# Patient Record
Sex: MALE | Race: WHITE
[De-identification: names, ages, dates, MRNs, and addresses within clinical notes are randomized per-mention and may not be internally consistent; named-entity substitution may affect disease eponyms.]

---

## 2021-09-02 ENCOUNTER — HOSPITAL ENCOUNTER (EMERGENCY)
Dept: HOSPITAL 95 - ER | Age: 81
LOS: 1 days | Discharge: HOME | End: 2021-09-03
Payer: COMMERCIAL

## 2021-09-02 VITALS — HEIGHT: 78 IN | BODY MASS INDEX: 36.45 KG/M2 | WEIGHT: 315 LBS

## 2021-09-02 DIAGNOSIS — Z79.899: ICD-10-CM

## 2021-09-02 DIAGNOSIS — S52.124A: Primary | ICD-10-CM

## 2021-09-02 DIAGNOSIS — I10: ICD-10-CM
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
## 2021-09-02 DIAGNOSIS — Z79.82: ICD-10-CM

## 2021-09-02 DIAGNOSIS — S01.111A: ICD-10-CM

## 2021-09-02 DIAGNOSIS — Z88.5: ICD-10-CM

## 2021-09-02 DIAGNOSIS — I25.10: ICD-10-CM

## 2021-09-02 DIAGNOSIS — Z88.8: ICD-10-CM

## 2021-09-02 DIAGNOSIS — E11.9: ICD-10-CM

## 2021-09-02 DIAGNOSIS — Z79.4: ICD-10-CM

## 2021-09-02 DIAGNOSIS — S02.2XXA: ICD-10-CM
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
## 2021-11-01 ENCOUNTER — HOSPITAL ENCOUNTER (OUTPATIENT)
Dept: HOSPITAL 95 - LAB SHORT | Age: 81
Discharge: HOME | End: 2021-11-01
Attending: SPECIALIST
Payer: COMMERCIAL

## 2021-11-01 DIAGNOSIS — D52.8: ICD-10-CM

## 2021-11-01 DIAGNOSIS — N18.30: Primary | ICD-10-CM

## 2021-11-01 DIAGNOSIS — R94.6: ICD-10-CM

## 2021-11-01 DIAGNOSIS — E55.9: ICD-10-CM

## 2021-11-01 DIAGNOSIS — D51.8: ICD-10-CM

## 2021-11-01 DIAGNOSIS — E78.00: ICD-10-CM

## 2021-11-01 DIAGNOSIS — R76.9: ICD-10-CM

## 2021-11-01 DIAGNOSIS — R94.5: ICD-10-CM

## 2021-11-01 DIAGNOSIS — D50.9: ICD-10-CM

## 2021-11-01 DIAGNOSIS — D63.1: ICD-10-CM

## 2021-11-01 DIAGNOSIS — N25.81: ICD-10-CM

## 2021-11-01 LAB — PROT UR-MCNC: 18 MG/DL (ref 0–11.9)

## 2021-11-05 LAB — ALUMINUM/CREAT UR: 33 (ref 0–49)

## 2022-03-17 ENCOUNTER — HOSPITAL ENCOUNTER (OUTPATIENT)
Dept: HOSPITAL 95 - ER | Age: 82
Setting detail: OBSERVATION
LOS: 2 days | Discharge: HOME | End: 2022-03-19
Attending: HOSPITALIST | Admitting: HOSPITALIST
Payer: COMMERCIAL

## 2022-03-17 VITALS — BODY MASS INDEX: 36.45 KG/M2 | HEIGHT: 78 IN | WEIGHT: 315 LBS

## 2022-03-17 DIAGNOSIS — Z95.5: ICD-10-CM

## 2022-03-17 DIAGNOSIS — I44.30: ICD-10-CM

## 2022-03-17 DIAGNOSIS — I12.9: ICD-10-CM

## 2022-03-17 DIAGNOSIS — N18.30: ICD-10-CM

## 2022-03-17 DIAGNOSIS — E78.5: ICD-10-CM

## 2022-03-17 DIAGNOSIS — Z79.82: ICD-10-CM

## 2022-03-17 DIAGNOSIS — Z95.0: ICD-10-CM

## 2022-03-17 DIAGNOSIS — Z79.4: ICD-10-CM

## 2022-03-17 DIAGNOSIS — I24.9: Primary | ICD-10-CM

## 2022-03-17 DIAGNOSIS — Z91.14: ICD-10-CM

## 2022-03-17 DIAGNOSIS — I25.10: ICD-10-CM

## 2022-03-17 DIAGNOSIS — E11.22: ICD-10-CM

## 2022-03-17 LAB
ALBUMIN SERPL BCP-MCNC: 3.3 G/DL (ref 3.4–5)
ALBUMIN/GLOB SERPL: 0.7 {RATIO} (ref 0.8–1.8)
ALT SERPL W P-5'-P-CCNC: 28 U/L (ref 12–78)
ANION GAP SERPL CALCULATED.4IONS-SCNC: 6 MMOL/L (ref 6–16)
AST SERPL W P-5'-P-CCNC: 19 U/L (ref 12–37)
BASOPHILS # BLD AUTO: 0.05 K/MM3 (ref 0–0.23)
BASOPHILS NFR BLD AUTO: 1 % (ref 0–2)
BILIRUB SERPL-MCNC: 0.7 MG/DL (ref 0.1–1)
BUN SERPL-MCNC: 34 MG/DL (ref 8–24)
CALCIUM SERPL-MCNC: 8.6 MG/DL (ref 8.5–10.1)
CHLORIDE SERPL-SCNC: 109 MMOL/L (ref 98–108)
CO2 SERPL-SCNC: 23 MMOL/L (ref 21–32)
CREAT SERPL-MCNC: 1.5 MG/DL (ref 0.6–1.2)
DEPRECATED RDW RBC AUTO: 46.1 FL (ref 35.1–46.3)
EOSINOPHIL # BLD AUTO: 0.34 K/MM3 (ref 0–0.68)
EOSINOPHIL NFR BLD AUTO: 4 % (ref 0–6)
ERYTHROCYTE [DISTWIDTH] IN BLOOD BY AUTOMATED COUNT: 14.6 % (ref 11.7–14.2)
GLOBULIN SER CALC-MCNC: 4.6 G/DL (ref 2.2–4)
GLUCOSE SERPL-MCNC: 307 MG/DL (ref 70–99)
HCT VFR BLD AUTO: 39.1 % (ref 37–53)
HGB BLD-MCNC: 12.4 G/DL (ref 13.5–17.5)
IMM GRANULOCYTES # BLD AUTO: 0.03 K/MM3 (ref 0–0.1)
IMM GRANULOCYTES NFR BLD AUTO: 0 % (ref 0–1)
LYMPHOCYTES # BLD AUTO: 1.8 K/MM3 (ref 0.84–5.2)
LYMPHOCYTES NFR BLD AUTO: 21 % (ref 21–46)
MCHC RBC AUTO-ENTMCNC: 31.7 G/DL (ref 31.5–36.5)
MCV RBC AUTO: 88 FL (ref 80–100)
MONOCYTES # BLD AUTO: 0.49 K/MM3 (ref 0.16–1.47)
MONOCYTES NFR BLD AUTO: 6 % (ref 4–13)
NEUTROPHILS # BLD AUTO: 5.84 K/MM3 (ref 1.96–9.15)
NEUTROPHILS NFR BLD AUTO: 68 % (ref 41–73)
NRBC # BLD AUTO: 0 K/MM3 (ref 0–0.02)
NRBC BLD AUTO-RTO: 0 /100 WBC (ref 0–0.2)
PLATELET # BLD AUTO: 174 K/MM3 (ref 150–400)
POTASSIUM SERPL-SCNC: 5.9 MMOL/L (ref 3.5–5.5)
PROT SERPL-MCNC: 7.9 G/DL (ref 6.4–8.2)
SODIUM SERPL-SCNC: 138 MMOL/L (ref 136–145)

## 2022-03-17 PROCEDURE — A9500 TC99M SESTAMIBI: HCPCS

## 2022-03-17 PROCEDURE — A9270 NON-COVERED ITEM OR SERVICE: HCPCS

## 2022-03-18 LAB
ALBUMIN SERPL BCP-MCNC: 2.9 G/DL (ref 3.4–5)
ALBUMIN/GLOB SERPL: 0.7 {RATIO} (ref 0.8–1.8)
ALT SERPL W P-5'-P-CCNC: 19 U/L (ref 12–78)
ANION GAP SERPL CALCULATED.4IONS-SCNC: 5 MMOL/L (ref 6–16)
AST SERPL W P-5'-P-CCNC: 17 U/L (ref 12–37)
BASOPHILS # BLD AUTO: 0.03 K/MM3 (ref 0–0.23)
BASOPHILS NFR BLD AUTO: 0 % (ref 0–2)
BILIRUB SERPL-MCNC: 0.4 MG/DL (ref 0.1–1)
BUN SERPL-MCNC: 31 MG/DL (ref 8–24)
CALCIUM SERPL-MCNC: 8.7 MG/DL (ref 8.5–10.1)
CHLORIDE SERPL-SCNC: 112 MMOL/L (ref 98–108)
CK SERPL-CCNC: 151 U/L (ref 39–308)
CK SERPL-CCNC: 153 U/L (ref 39–308)
CO2 SERPL-SCNC: 24 MMOL/L (ref 21–32)
CREAT SERPL-MCNC: 1.4 MG/DL (ref 0.6–1.2)
DEPRECATED RDW RBC AUTO: 47 FL (ref 35.1–46.3)
EOSINOPHIL # BLD AUTO: 0.25 K/MM3 (ref 0–0.68)
EOSINOPHIL NFR BLD AUTO: 4 % (ref 0–6)
ERYTHROCYTE [DISTWIDTH] IN BLOOD BY AUTOMATED COUNT: 14.6 % (ref 11.7–14.2)
GLOBULIN SER CALC-MCNC: 4.4 G/DL (ref 2.2–4)
GLUCOSE SERPL-MCNC: 211 MG/DL (ref 70–99)
HCT VFR BLD AUTO: 36.8 % (ref 37–53)
HGB BLD-MCNC: 11.7 G/DL (ref 13.5–17.5)
IMM GRANULOCYTES # BLD AUTO: 0.02 K/MM3 (ref 0–0.1)
IMM GRANULOCYTES NFR BLD AUTO: 0 % (ref 0–1)
LYMPHOCYTES # BLD AUTO: 1.46 K/MM3 (ref 0.84–5.2)
LYMPHOCYTES NFR BLD AUTO: 21 % (ref 21–46)
MCHC RBC AUTO-ENTMCNC: 31.8 G/DL (ref 31.5–36.5)
MCV RBC AUTO: 88 FL (ref 80–100)
MONOCYTES # BLD AUTO: 0.42 K/MM3 (ref 0.16–1.47)
MONOCYTES NFR BLD AUTO: 6 % (ref 4–13)
NEUTROPHILS # BLD AUTO: 4.68 K/MM3 (ref 1.96–9.15)
NEUTROPHILS NFR BLD AUTO: 68 % (ref 41–73)
NRBC # BLD AUTO: 0 K/MM3 (ref 0–0.02)
NRBC BLD AUTO-RTO: 0 /100 WBC (ref 0–0.2)
PLATELET # BLD AUTO: 131 K/MM3 (ref 150–400)
POTASSIUM SERPL-SCNC: 5.4 MMOL/L (ref 3.5–5.5)
PROT SERPL-MCNC: 7.3 G/DL (ref 6.4–8.2)
SODIUM SERPL-SCNC: 141 MMOL/L (ref 136–145)

## 2022-03-18 NOTE — NUR
ALERT. ORIENTED. INDEPENDENT IN ROOM. ABLE TO MAKE NEEDS KNOWN. DENIES ANY
C.P/DISCOMFORT. UNLABORED RESPIRATIONS. TELE ON AND HAS BEEN PACED RYTHM.
TROPONINS NEGATIVE. COOPERATIVE. PLEASANT. SECOND PART OF STRESS TOMORROW
MORNING WITH PATIENT AWARE NO CAFFEINE AFTER DINNER. WCTM

## 2022-03-18 NOTE — NUR
Patient is alert and oriented x4 ambulates with walker. hx of fall, educated
pt about calling when ambulating to the restroom to assist him with walking.
Patient understands, but insist on walking by himself and sometimes does not
use walker. Skin intact. Patient complains of abdominal and chest pain, but
does not want to take pain medication. BP is elevated, informed MD.
Hydralazine IV ordered for SBP greater than 160s. Patient is now resting on
bed in lowest position. Patient is incontinent in urine due to Hx of prostate
cancer. Call light within reach.

## 2022-03-19 LAB
CHOLEST SERPL-MCNC: 172 MG/DL (ref 50–200)
CHOLEST/HDLC SERPL: 4.8 {RATIO}
HDLC SERPL-MCNC: 36 MG/DL (ref 39–?)
LDLC SERPL CALC-MCNC: 101 MG/DL (ref 0–110)
LDLC/HDLC SERPL: 2.8 {RATIO}
TRIGL SERPL-MCNC: 174 MG/DL (ref 30–160)
VLDLC SERPL CALC-MCNC: 34 MG/DL (ref 6–32)

## 2022-03-19 NOTE — NUR
pt having a stress test this am, a/ox3, pleasant and cooperative with care,
follows commands well, denies chest pain, states he slept ok, lungs are clear
t/o, on r/a, resp even and unlabored, no cough noted, hrr, tele in place
running paced rhythm, no edema noted, ppp+2, cap refill <3sec, vs stable,
afebrile, iv site is clear and patent, btx4, abd round soft nontender, voids
without diff, skin c/w/d, maew, up indep, teri, call light in reach.

## 2022-03-19 NOTE — NUR
Pt has been discharged to home, [facility],
iv removed intact, went over instructions with him, he verbalized
understanding, gave a copy of a medium sliding scale to him and explained it.
he verbalized understanding, and has all his belongings, and hard copy script
for glucose monitoring supplies. left via wheelchair with cna in attendence.

## 2022-07-06 ENCOUNTER — HOSPITAL ENCOUNTER (OUTPATIENT)
Dept: HOSPITAL 95 - MHTC | Age: 82
Discharge: HOME | End: 2022-07-06
Attending: NURSE PRACTITIONER
Payer: COMMERCIAL

## 2022-07-06 VITALS — HEIGHT: 78 IN | BODY MASS INDEX: 36.45 KG/M2 | WEIGHT: 315 LBS

## 2022-07-06 DIAGNOSIS — E78.00: ICD-10-CM

## 2022-07-06 DIAGNOSIS — Z95.0: ICD-10-CM

## 2022-07-06 DIAGNOSIS — I77.810: ICD-10-CM

## 2022-07-06 DIAGNOSIS — Z88.5: ICD-10-CM

## 2022-07-06 DIAGNOSIS — I44.30: ICD-10-CM

## 2022-07-06 DIAGNOSIS — I25.110: Primary | ICD-10-CM

## 2022-07-06 DIAGNOSIS — E11.22: ICD-10-CM

## 2022-07-06 DIAGNOSIS — Z86.39: ICD-10-CM

## 2022-07-06 DIAGNOSIS — G47.33: ICD-10-CM

## 2022-07-06 DIAGNOSIS — Z79.4: ICD-10-CM

## 2022-07-06 DIAGNOSIS — Z90.49: ICD-10-CM

## 2022-07-06 DIAGNOSIS — D69.6: ICD-10-CM

## 2022-07-06 DIAGNOSIS — Z45.2: ICD-10-CM

## 2022-07-06 DIAGNOSIS — N18.30: ICD-10-CM

## 2022-07-06 DIAGNOSIS — I12.9: ICD-10-CM

## 2022-07-06 LAB
ANION GAP SERPL CALCULATED.4IONS-SCNC: 8 MMOL/L (ref 6–16)
BASOPHILS # BLD AUTO: 0.04 K/MM3 (ref 0–0.23)
BASOPHILS NFR BLD AUTO: 0 % (ref 0–2)
BUN SERPL-MCNC: 44 MG/DL (ref 8–24)
CALCIUM SERPL-MCNC: 8.8 MG/DL (ref 8.5–10.1)
CHLORIDE SERPL-SCNC: 112 MMOL/L (ref 98–108)
CO2 SERPL-SCNC: 23 MMOL/L (ref 21–32)
CREAT SERPL-MCNC: 1.73 MG/DL (ref 0.6–1.2)
DEPRECATED RDW RBC AUTO: 44.3 FL (ref 35.1–46.3)
EOSINOPHIL # BLD AUTO: 0.24 K/MM3 (ref 0–0.68)
EOSINOPHIL NFR BLD AUTO: 2 % (ref 0–6)
ERYTHROCYTE [DISTWIDTH] IN BLOOD BY AUTOMATED COUNT: 14 % (ref 11.7–14.2)
GLUCOSE SERPL-MCNC: 201 MG/DL (ref 70–99)
HCT VFR BLD AUTO: 40.3 % (ref 37–53)
HGB BLD-MCNC: 13.1 G/DL (ref 13.5–17.5)
IMM GRANULOCYTES # BLD AUTO: 0.04 K/MM3 (ref 0–0.1)
IMM GRANULOCYTES NFR BLD AUTO: 0 % (ref 0–1)
LYMPHOCYTES # BLD AUTO: 1.69 K/MM3 (ref 0.84–5.2)
LYMPHOCYTES NFR BLD AUTO: 17 % (ref 21–46)
MCHC RBC AUTO-ENTMCNC: 32.5 G/DL (ref 31.5–36.5)
MCV RBC AUTO: 87 FL (ref 80–100)
MONOCYTES # BLD AUTO: 0.55 K/MM3 (ref 0.16–1.47)
MONOCYTES NFR BLD AUTO: 6 % (ref 4–13)
NEUTROPHILS # BLD AUTO: 7.36 K/MM3 (ref 1.96–9.15)
NEUTROPHILS NFR BLD AUTO: 74 % (ref 41–73)
NRBC # BLD AUTO: 0 K/MM3 (ref 0–0.02)
NRBC BLD AUTO-RTO: 0 /100 WBC (ref 0–0.2)
PLATELET # BLD AUTO: 115 K/MM3 (ref 150–400)
POTASSIUM SERPL-SCNC: 4.9 MMOL/L (ref 3.5–5.5)
SODIUM SERPL-SCNC: 143 MMOL/L (ref 136–145)

## 2022-07-06 PROCEDURE — C1781 MESH (IMPLANTABLE): HCPCS

## 2022-07-06 PROCEDURE — C1785 PMKR, DUAL, RATE-RESP: HCPCS

## 2022-07-06 PROCEDURE — C1894 INTRO/SHEATH, NON-LASER: HCPCS

## 2022-07-06 PROCEDURE — C1769 GUIDE WIRE: HCPCS

## 2022-07-06 NOTE — NUR
RIGHT RADIAL TR BAND FULLY DEFLATED. NO BLEEDING OR SWELLING NOTED. VSS. LEFT
CHEST SITE REMAINS CLEAN, DRY, INTACT.

## 2022-07-06 NOTE — NUR
PT RETURNED TO RECOVERY ROOM POST PACEMAKER GEN CHANGE OUT.  PT AWAKE AND
CONVERSING APPROPRIATELY; DENIES PAIN POST PROCEDURE.  MONITOR PACED 70'S, B/P
169/84, AFEBRILE, SPO2 97% RA.  L CHEST NO SWELLING/HEMATOMA, TELFA AND
TEGADERM DRSG INTACT.  R RADIAL SITE NO SWELLING/HEMATOMA, TR BAND IN PLACE;
RUE POSITIVE PLEUTH.  PT'S WIFE AT BEDSIDE, ATTENTIVE.

## 2022-07-06 NOTE — NUR
LEFT CHEST SITE AND RIGHT RADIAL SITE CLEAN, DRY, AND INTACT. NO BLEEDING OR
SWELLING NOTED AT EITHER SITE. SPLINT AND SLING IN PLACE ON RIGHT ARM. IV
DC'D, CATH INTACT. PT AND SPOUSE VERBALIZED UNDERSTANDING OF DC INSTRUCTIONS
AND FOLLOW UP INFO. PT OUT TO CAR VIA WHEELCHAIR.

## 2022-07-21 ENCOUNTER — HOSPITAL ENCOUNTER (OUTPATIENT)
Dept: HOSPITAL 95 - MHTC | Age: 82
LOS: 1 days | Discharge: HOME | End: 2022-07-22
Attending: INTERNAL MEDICINE
Payer: COMMERCIAL

## 2022-07-21 VITALS — HEIGHT: 78 IN | WEIGHT: 315 LBS | BODY MASS INDEX: 36.45 KG/M2

## 2022-07-21 DIAGNOSIS — I77.810: ICD-10-CM

## 2022-07-21 DIAGNOSIS — Z86.39: ICD-10-CM

## 2022-07-21 DIAGNOSIS — I25.110: ICD-10-CM

## 2022-07-21 DIAGNOSIS — E78.5: ICD-10-CM

## 2022-07-21 DIAGNOSIS — I12.9: ICD-10-CM

## 2022-07-21 DIAGNOSIS — I44.30: Primary | ICD-10-CM

## 2022-07-21 DIAGNOSIS — G47.33: ICD-10-CM

## 2022-07-21 DIAGNOSIS — E11.22: ICD-10-CM

## 2022-07-21 DIAGNOSIS — Z95.0: ICD-10-CM

## 2022-07-21 DIAGNOSIS — Z88.5: ICD-10-CM

## 2022-07-21 DIAGNOSIS — Z88.8: ICD-10-CM

## 2022-07-21 DIAGNOSIS — N18.30: ICD-10-CM

## 2022-07-21 DIAGNOSIS — E78.00: ICD-10-CM

## 2022-07-21 DIAGNOSIS — D69.6: ICD-10-CM

## 2022-07-21 PROCEDURE — C1753 CATH, INTRAVAS ULTRASOUND: HCPCS

## 2022-07-21 PROCEDURE — C1874 STENT, COATED/COV W/DEL SYS: HCPCS

## 2022-07-21 PROCEDURE — C1769 GUIDE WIRE: HCPCS

## 2022-07-21 PROCEDURE — C1887 CATHETER, GUIDING: HCPCS

## 2022-07-21 PROCEDURE — C1725 CATH, TRANSLUMIN NON-LASER: HCPCS

## 2022-07-21 PROCEDURE — C1894 INTRO/SHEATH, NON-LASER: HCPCS

## 2022-07-21 PROCEDURE — C9602 PERC D-E COR STENT ATHER S: HCPCS

## 2022-07-21 PROCEDURE — A9270 NON-COVERED ITEM OR SERVICE: HCPCS

## 2022-07-21 PROCEDURE — C1761: HCPCS

## 2022-07-21 NOTE — NUR
PT ARRIVED FROM CATH LAB THIS AFTERNOON POST ANGIOGRAM WITH STENT PLACEMENT TO
RCA. PT DOES REPORT SOME MILD DISCOMFORT TO LEFT CHEST WALL, ACCORDING TO CATH
LAB STAFF THIS PAIN WAS EXPECTED WITH THE EXTENT OF WORK THAT WAS PERFORMED TO
PLACE STENT. PT A/O X4, ANSWERING QUESTIONS APPROPRIATELY IN FULL SENTENCES.
RT RADIAL SITE FREE OF BLEEDING, THERE IS A SMALL HEMATOMA TO THE SITE THAT IS
NOT GROWING, THE AREA IS MARKED SO THAT IT CAN BE MONITORED. WILL BEGIN
RECOVERING TR BAND SOON. PT WITH GOOD APPETITE. DENIES SOB. HTN NOTED, DR HARMAN CALLED NEW ORDERS OBTAINED.

## 2022-07-21 NOTE — NUR
PT RETURNED TO RECOVERY ROOM IN RECLINER. RIGHT RADIAL TR BAND SITE SOFT
NON-TENDER WITH NO  HEMATOMA, NO PULSATILE BLEEDING WITH WRIST BOARD IN PLACE.
PT DENIES CHEST PAIN. CALL LIGHT IN REACH. PT HAS TACHY CARDIA; SEE NEW
ORDERS.

## 2022-07-22 LAB
ANION GAP SERPL CALCULATED.4IONS-SCNC: 6 MMOL/L (ref 6–16)
BUN SERPL-MCNC: 37 MG/DL (ref 8–24)
CALCIUM SERPL-MCNC: 8.6 MG/DL (ref 8.5–10.1)
CHLORIDE SERPL-SCNC: 106 MMOL/L (ref 98–108)
CO2 SERPL-SCNC: 23 MMOL/L (ref 21–32)
CREAT SERPL-MCNC: 1.63 MG/DL (ref 0.6–1.2)
DEPRECATED RDW RBC AUTO: 43.7 FL (ref 35.1–46.3)
ERYTHROCYTE [DISTWIDTH] IN BLOOD BY AUTOMATED COUNT: 13.8 % (ref 11.7–14.2)
GLUCOSE SERPL-MCNC: 319 MG/DL (ref 70–99)
HCT VFR BLD AUTO: 36.5 % (ref 37–53)
HGB BLD-MCNC: 12 G/DL (ref 13.5–17.5)
MCHC RBC AUTO-ENTMCNC: 32.9 G/DL (ref 31.5–36.5)
MCV RBC AUTO: 87 FL (ref 80–100)
NRBC # BLD AUTO: 0 K/MM3 (ref 0–0.02)
NRBC BLD AUTO-RTO: 0 /100 WBC (ref 0–0.2)
PLATELET # BLD AUTO: 120 K/MM3 (ref 150–400)
POTASSIUM SERPL-SCNC: 5.7 MMOL/L (ref 3.5–5.5)
SODIUM SERPL-SCNC: 135 MMOL/L (ref 136–145)

## 2022-07-22 NOTE — NUR
PT A/O X4, ANSWERING ALL QUESTIONS APPROPRIATELY IN FULL SENTENCES. NADN. VSS.
DENIES CP OR SOB. PACED RHYTHM AT 80 ON MONITOR. LS CLEAR T/O. INDEPENDANT TO
BATHROOM AND TO SHOWER THIS AM. DENIES PAIN T/O BODY. RT RADIAL SIDE IS SOFT,
SOVERED, DRY, NO BLEEDING, BRUISING NOTED THAT SEEMS IMPROVED FROM LAST NOC,
NO ACTIVE BLEEDING OR DRAINAGE FROM SITE, DENIES ANY PAIN. SKIN OTHERWISE PWD
AND INTACT.

## 2022-07-22 NOTE — NUR
SHIFT SUMMARY
 
PT ALERT AND ORIENTED X4. AFEBRILE. HR PACED 80'S MOST OF NIGHT. BETWEEN 2200
AND 0000 PT WOULD SUSTAIN HR  FOR OVER 15 MINUTES AT A TIME. BY HIGH TO
START SHIFT WITH SYSTOLIC 'S. RELIEF W/ ADDITIONAL NORVASC DOSE AND
HYDRALAZINE. TR BAND OF AT 2300. SITE C/D/I. PT DENIES CP. INDEPENDENT FOR
ADLS. IN BED SLEEPING WITH CALL ALARM AT SIDE, WILL CONTINUE TO MONITOR UNTIL
REPORT GIVEN TO DAYSHIFT RN none

## 2022-07-23 ENCOUNTER — HOSPITAL ENCOUNTER (OUTPATIENT)
Dept: HOSPITAL 95 - ER | Age: 82
Setting detail: OBSERVATION
LOS: 1 days | Discharge: HOME | End: 2022-07-24
Attending: HOSPITALIST | Admitting: HOSPITALIST
Payer: COMMERCIAL

## 2022-07-23 VITALS — HEIGHT: 78 IN | WEIGHT: 315 LBS | BODY MASS INDEX: 36.45 KG/M2

## 2022-07-23 DIAGNOSIS — I21.A1: Primary | ICD-10-CM

## 2022-07-23 DIAGNOSIS — E66.01: ICD-10-CM

## 2022-07-23 DIAGNOSIS — R74.8: ICD-10-CM

## 2022-07-23 DIAGNOSIS — G47.33: ICD-10-CM

## 2022-07-23 DIAGNOSIS — Z79.899: ICD-10-CM

## 2022-07-23 DIAGNOSIS — E87.5: ICD-10-CM

## 2022-07-23 DIAGNOSIS — Z88.5: ICD-10-CM

## 2022-07-23 DIAGNOSIS — E78.5: ICD-10-CM

## 2022-07-23 DIAGNOSIS — E87.1: ICD-10-CM

## 2022-07-23 DIAGNOSIS — Z95.5: ICD-10-CM

## 2022-07-23 DIAGNOSIS — E87.2: ICD-10-CM

## 2022-07-23 DIAGNOSIS — I50.30: ICD-10-CM

## 2022-07-23 DIAGNOSIS — I25.10: ICD-10-CM

## 2022-07-23 DIAGNOSIS — Z79.4: ICD-10-CM

## 2022-07-23 DIAGNOSIS — R00.0: ICD-10-CM

## 2022-07-23 DIAGNOSIS — Z79.82: ICD-10-CM

## 2022-07-23 DIAGNOSIS — E11.22: ICD-10-CM

## 2022-07-23 DIAGNOSIS — I13.0: ICD-10-CM

## 2022-07-23 DIAGNOSIS — I44.30: ICD-10-CM

## 2022-07-23 DIAGNOSIS — Z79.02: ICD-10-CM

## 2022-07-23 DIAGNOSIS — Z88.8: ICD-10-CM

## 2022-07-23 DIAGNOSIS — N18.32: ICD-10-CM

## 2022-07-23 DIAGNOSIS — D63.1: ICD-10-CM

## 2022-07-23 DIAGNOSIS — I77.810: ICD-10-CM

## 2022-07-23 DIAGNOSIS — D69.6: ICD-10-CM

## 2022-07-23 LAB
ALBUMIN SERPL BCP-MCNC: 3.4 G/DL (ref 3.4–5)
ALBUMIN/GLOB SERPL: 0.8 {RATIO} (ref 0.8–1.8)
ALT SERPL W P-5'-P-CCNC: 21 U/L (ref 12–78)
ANION GAP SERPL CALCULATED.4IONS-SCNC: 9 MMOL/L (ref 6–16)
AST SERPL W P-5'-P-CCNC: 15 U/L (ref 12–37)
BASOPHILS # BLD AUTO: 0.06 K/MM3 (ref 0–0.23)
BASOPHILS NFR BLD AUTO: 1 % (ref 0–2)
BILIRUB SERPL-MCNC: 0.7 MG/DL (ref 0.1–1)
BUN SERPL-MCNC: 44 MG/DL (ref 8–24)
CALCIUM SERPL-MCNC: 8.5 MG/DL (ref 8.5–10.1)
CHLORIDE SERPL-SCNC: 104 MMOL/L (ref 98–108)
CK MB CFR SERPL CALC: 3.8 % (ref 0–4)
CK SERPL-CCNC: 207 U/L (ref 39–308)
CO2 SERPL-SCNC: 21 MMOL/L (ref 21–32)
CREAT SERPL-MCNC: 2.03 MG/DL (ref 0.6–1.2)
DEPRECATED RDW RBC AUTO: 44.3 FL (ref 35.1–46.3)
EOSINOPHIL # BLD AUTO: 0.25 K/MM3 (ref 0–0.68)
EOSINOPHIL NFR BLD AUTO: 3 % (ref 0–6)
ERYTHROCYTE [DISTWIDTH] IN BLOOD BY AUTOMATED COUNT: 13.8 % (ref 11.7–14.2)
GLOBULIN SER CALC-MCNC: 4.3 G/DL (ref 2.2–4)
GLUCOSE SERPL-MCNC: 391 MG/DL (ref 70–99)
HCT VFR BLD AUTO: 38.8 % (ref 37–53)
HGB BLD-MCNC: 12.5 G/DL (ref 13.5–17.5)
IMM GRANULOCYTES # BLD AUTO: 0.04 K/MM3 (ref 0–0.1)
IMM GRANULOCYTES NFR BLD AUTO: 1 % (ref 0–1)
LYMPHOCYTES # BLD AUTO: 1.83 K/MM3 (ref 0.84–5.2)
LYMPHOCYTES NFR BLD AUTO: 21 % (ref 21–46)
MCHC RBC AUTO-ENTMCNC: 32.2 G/DL (ref 31.5–36.5)
MCV RBC AUTO: 88 FL (ref 80–100)
MONOCYTES # BLD AUTO: 0.43 K/MM3 (ref 0.16–1.47)
MONOCYTES NFR BLD AUTO: 5 % (ref 4–13)
NEUTROPHILS # BLD AUTO: 6.09 K/MM3 (ref 1.96–9.15)
NEUTROPHILS NFR BLD AUTO: 70 % (ref 41–73)
NRBC # BLD AUTO: 0 K/MM3 (ref 0–0.02)
NRBC BLD AUTO-RTO: 0 /100 WBC (ref 0–0.2)
PLATELET # BLD AUTO: 158 K/MM3 (ref 150–400)
POTASSIUM SERPL-SCNC: 5 MMOL/L (ref 3.5–5.5)
PROT SERPL-MCNC: 7.7 G/DL (ref 6.4–8.2)
SODIUM SERPL-SCNC: 134 MMOL/L (ref 136–145)

## 2022-07-23 PROCEDURE — G0378 HOSPITAL OBSERVATION PER HR: HCPCS

## 2022-07-23 PROCEDURE — A9270 NON-COVERED ITEM OR SERVICE: HCPCS

## 2022-07-23 NOTE — NUR
ADMIT NOTE
RECEIVED HANDOFF FROM ER RN RADHA. PT ARRIVED TO FLOOR VIA GURNEY. PT ORIENTED
TO UNIT. CALL BUTTON WITHIN REACH. PERSONAL POSSESSIONS ARE WITH PT.

## 2022-07-24 LAB
ALBUMIN SERPL BCP-MCNC: 3.1 G/DL (ref 3.4–5)
ALBUMIN/GLOB SERPL: 0.8 {RATIO} (ref 0.8–1.8)
ALT SERPL W P-5'-P-CCNC: 23 U/L (ref 12–78)
ANION GAP SERPL CALCULATED.4IONS-SCNC: 5 MMOL/L (ref 6–16)
AST SERPL W P-5'-P-CCNC: 18 U/L (ref 12–37)
BASOPHILS # BLD AUTO: 0.04 K/MM3 (ref 0–0.23)
BASOPHILS NFR BLD AUTO: 1 % (ref 0–2)
BILIRUB SERPL-MCNC: 0.7 MG/DL (ref 0.1–1)
BUN SERPL-MCNC: 43 MG/DL (ref 8–24)
CALCIUM SERPL-MCNC: 8.2 MG/DL (ref 8.5–10.1)
CHLORIDE SERPL-SCNC: 110 MMOL/L (ref 98–108)
CO2 SERPL-SCNC: 24 MMOL/L (ref 21–32)
CREAT SERPL-MCNC: 1.87 MG/DL (ref 0.6–1.2)
DEPRECATED RDW RBC AUTO: 44.4 FL (ref 35.1–46.3)
EOSINOPHIL # BLD AUTO: 0.26 K/MM3 (ref 0–0.68)
EOSINOPHIL NFR BLD AUTO: 3 % (ref 0–6)
ERYTHROCYTE [DISTWIDTH] IN BLOOD BY AUTOMATED COUNT: 13.8 % (ref 11.7–14.2)
GLOBULIN SER CALC-MCNC: 4 G/DL (ref 2.2–4)
GLUCOSE SERPL-MCNC: 262 MG/DL (ref 70–99)
HCT VFR BLD AUTO: 38.4 % (ref 37–53)
HGB BLD-MCNC: 12.2 G/DL (ref 13.5–17.5)
IMM GRANULOCYTES # BLD AUTO: 0.03 K/MM3 (ref 0–0.1)
IMM GRANULOCYTES NFR BLD AUTO: 0 % (ref 0–1)
LYMPHOCYTES # BLD AUTO: 1.5 K/MM3 (ref 0.84–5.2)
LYMPHOCYTES NFR BLD AUTO: 20 % (ref 21–46)
MCHC RBC AUTO-ENTMCNC: 31.8 G/DL (ref 31.5–36.5)
MCV RBC AUTO: 89 FL (ref 80–100)
MONOCYTES # BLD AUTO: 0.62 K/MM3 (ref 0.16–1.47)
MONOCYTES NFR BLD AUTO: 8 % (ref 4–13)
NEUTROPHILS # BLD AUTO: 5.26 K/MM3 (ref 1.96–9.15)
NEUTROPHILS NFR BLD AUTO: 68 % (ref 41–73)
NRBC # BLD AUTO: 0 K/MM3 (ref 0–0.02)
NRBC BLD AUTO-RTO: 0 /100 WBC (ref 0–0.2)
PLATELET # BLD AUTO: 116 K/MM3 (ref 150–400)
POTASSIUM SERPL-SCNC: 5 MMOL/L (ref 3.5–5.5)
PROT SERPL-MCNC: 7.1 G/DL (ref 6.4–8.2)
SODIUM SERPL-SCNC: 139 MMOL/L (ref 136–145)

## 2022-07-24 NOTE — NUR
SHIFT SUMMARY
ADMITTED FOR CHEST PAIN/HYPOTENSION - RESOLVED. FULL CODE. STENT PLACED 2 DAYS
AGO. RIGHT RADIAL ACCESS IS TENDER TO PALPATION. PACER IN PLACE, INTERROGATED
AND ADJUSTED IN ER. TELEMETRY: PACED @ 81 BPM. A&O X4. INDEPENDENT IN ROOM. NS
@ 150 ML/HR INFUSING. ADA DIET. WILL PASS ON TO DAY RN - NEED FOR AC
CHEMSTICKS ORDER. CARDIOLOGY CONSULT WAS CONTACTED BY ER DOCTOR. TROPONINS
TRENDING DOWN

## 2022-07-24 NOTE — NUR
DISCHARGE SUMMARY
PT AxOx4. PLEASANT AND COOPERATIVE WITH CARE. PT DISCHARGING HOME TODAY WITH
WIFE. PT GIVEN DC INSTRUCTIONS INCLUDING DC MEDICATION LIST, FOLLOW UP
APPOINTMENTS AND PATIENT EDUCATION. PT AND WIFE VERBALIZE UNDERSTANDING.
 VITALS REVIEWED.  PT DENIES PAIN THIS SHIFT. PT
DC SAFELY WITH CNA ESCORT.

## 2024-12-26 ENCOUNTER — HOSPITAL ENCOUNTER (OUTPATIENT)
Dept: HOSPITAL 95 - LAB SHORT | Age: 84
End: 2024-12-26
Attending: FAMILY MEDICINE
Payer: COMMERCIAL

## 2024-12-26 DIAGNOSIS — R53.83: ICD-10-CM

## 2024-12-26 DIAGNOSIS — R60.0: ICD-10-CM

## 2024-12-26 DIAGNOSIS — J18.9: Primary | ICD-10-CM

## 2024-12-26 LAB
ALBUMIN SERPL BCP-MCNC: 2.8 G/DL (ref 3.4–5)
ALBUMIN/GLOB SERPL: 0.6 {RATIO} (ref 0.8–1.8)
ALT SERPL W P-5'-P-CCNC: 25 U/L (ref 12–78)
ANION GAP SERPL CALCULATED.4IONS-SCNC: 16 MMOL/L (ref 3–11)
AST SERPL W P-5'-P-CCNC: 63 U/L (ref 12–37)
BASOPHILS # BLD AUTO: 0.04 K/MM3 (ref 0–0.23)
BASOPHILS NFR BLD AUTO: 1 % (ref 0–2)
BILIRUB SERPL-MCNC: 0.8 MG/DL (ref 0.1–1)
BUN SERPL-MCNC: 33 MG/DL (ref 8–24)
CALCIUM SERPL-MCNC: 8.3 MG/DL (ref 8.5–10.1)
CHLORIDE SERPL-SCNC: 107 MMOL/L (ref 98–108)
CO2 SERPL-SCNC: 25 MMOL/L (ref 21–32)
CREAT SERPL-MCNC: 2.01 MG/DL (ref 0.6–1.2)
DEPRECATED RDW RBC AUTO: 47.7 FL (ref 35.1–46.3)
EOSINOPHIL # BLD AUTO: 0.09 K/MM3 (ref 0–0.68)
EOSINOPHIL NFR BLD AUTO: 2 % (ref 0–6)
ERYTHROCYTE [DISTWIDTH] IN BLOOD BY AUTOMATED COUNT: 14.9 % (ref 11.7–14.2)
GLOBULIN SER CALC-MCNC: 4.5 G/DL (ref 2.2–4)
GLUCOSE SERPL-MCNC: 243 MG/DL (ref 70–99)
HCT VFR BLD AUTO: 35 % (ref 37–53)
HGB BLD-MCNC: 10.8 G/DL (ref 13.5–17.5)
IMM GRANULOCYTES # BLD AUTO: 0.09 K/MM3 (ref 0–0.1)
IMM GRANULOCYTES NFR BLD AUTO: 2 % (ref 0–1)
LYMPHOCYTES # BLD AUTO: 0.86 K/MM3 (ref 0.84–5.2)
LYMPHOCYTES NFR BLD AUTO: 15 % (ref 21–46)
MCHC RBC AUTO-ENTMCNC: 30.9 G/DL (ref 31.5–36.5)
MCV RBC AUTO: 89 FL (ref 80–100)
MONOCYTES # BLD AUTO: 0.42 K/MM3 (ref 0.16–1.47)
MONOCYTES NFR BLD AUTO: 7 % (ref 4–13)
NEUTROPHILS # BLD AUTO: 4.44 K/MM3 (ref 1.96–9.15)
NEUTROPHILS NFR BLD AUTO: 75 % (ref 41–73)
NRBC # BLD AUTO: 0 K/MM3 (ref 0–0.02)
NRBC BLD AUTO-RTO: 0 /100 WBC (ref 0–0.2)
PLATELET # BLD AUTO: 105 K/MM3 (ref 150–400)
POTASSIUM SERPL-SCNC: 4.4 MMOL/L (ref 3.5–5.5)
PROT SERPL-MCNC: 7.3 G/DL (ref 6.4–8.2)
SODIUM SERPL-SCNC: 144 MMOL/L (ref 136–145)
TSH SERPL DL<=0.005 MIU/L-ACNC: 4.44 UIU/ML (ref 0.36–4.8)